# Patient Record
Sex: FEMALE | Race: ASIAN | NOT HISPANIC OR LATINO | Employment: OTHER | URBAN - METROPOLITAN AREA
[De-identification: names, ages, dates, MRNs, and addresses within clinical notes are randomized per-mention and may not be internally consistent; named-entity substitution may affect disease eponyms.]

---
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
## 2019-09-23 ENCOUNTER — RECORDS - HEALTHEAST (OUTPATIENT)
Dept: LAB | Facility: CLINIC | Age: 19
End: 2019-09-23

## 2019-09-23 LAB — HIV 1+2 AB+HIV1 P24 AG SERPL QL IA: NEGATIVE

## 2019-09-24 LAB
C TRACH DNA SPEC QL PROBE+SIG AMP: NEGATIVE
N GONORRHOEA DNA SPEC QL NAA+PROBE: NEGATIVE
T PALLIDUM AB SER QL: NEGATIVE

## 2022-10-10 ENCOUNTER — LAB REQUISITION (OUTPATIENT)
Dept: LAB | Facility: CLINIC | Age: 22
End: 2022-10-10

## 2022-10-10 DIAGNOSIS — Z00.00 ENCOUNTER FOR GENERAL ADULT MEDICAL EXAMINATION WITHOUT ABNORMAL FINDINGS: ICD-10-CM

## 2022-10-10 DIAGNOSIS — Z01.419 ENCOUNTER FOR GYNECOLOGICAL EXAMINATION (GENERAL) (ROUTINE) WITHOUT ABNORMAL FINDINGS: ICD-10-CM

## 2022-10-10 PROCEDURE — G0145 SCR C/V CYTO,THINLAYER,RESCR: HCPCS | Performed by: PHYSICIAN ASSISTANT

## 2022-10-10 PROCEDURE — 87491 CHLMYD TRACH DNA AMP PROBE: CPT | Performed by: PHYSICIAN ASSISTANT

## 2022-10-11 LAB
C TRACH DNA SPEC QL PROBE+SIG AMP: NEGATIVE
N GONORRHOEA DNA SPEC QL NAA+PROBE: NEGATIVE

## 2022-10-13 LAB
BKR LAB AP GYN ADEQUACY: NORMAL
BKR LAB AP GYN INTERPRETATION: NORMAL
BKR LAB AP HPV REFLEX: NORMAL
BKR LAB AP LMP: NORMAL
BKR LAB AP PREVIOUS ABNORMAL: NORMAL
PATH REPORT.COMMENTS IMP SPEC: NORMAL
PATH REPORT.COMMENTS IMP SPEC: NORMAL
PATH REPORT.RELEVANT HX SPEC: NORMAL

## 2023-02-01 ENCOUNTER — LAB REQUISITION (OUTPATIENT)
Dept: LAB | Facility: CLINIC | Age: 23
End: 2023-02-01

## 2023-02-01 DIAGNOSIS — O20.9 HEMORRHAGE IN EARLY PREGNANCY, UNSPECIFIED: ICD-10-CM

## 2023-02-01 LAB — HCG INTACT+B SERPL-ACNC: ABNORMAL MIU/ML

## 2023-02-01 PROCEDURE — 84702 CHORIONIC GONADOTROPIN TEST: CPT | Performed by: PHYSICIAN ASSISTANT

## 2023-03-24 ENCOUNTER — LAB REQUISITION (OUTPATIENT)
Dept: LAB | Facility: CLINIC | Age: 23
End: 2023-03-24

## 2023-03-24 DIAGNOSIS — Z36.9 ENCOUNTER FOR ANTENATAL SCREENING, UNSPECIFIED: ICD-10-CM

## 2023-03-24 LAB
BASOPHILS # BLD AUTO: 0 10E3/UL (ref 0–0.2)
BASOPHILS NFR BLD AUTO: 0 %
EOSINOPHIL # BLD AUTO: 0 10E3/UL (ref 0–0.7)
EOSINOPHIL NFR BLD AUTO: 0 %
ERYTHROCYTE [DISTWIDTH] IN BLOOD BY AUTOMATED COUNT: 13.4 % (ref 10–15)
HCT VFR BLD AUTO: 36.9 % (ref 35–47)
HGB BLD-MCNC: 12 G/DL (ref 11.7–15.7)
IMM GRANULOCYTES # BLD: 0.1 10E3/UL
IMM GRANULOCYTES NFR BLD: 1 %
LYMPHOCYTES # BLD AUTO: 1.2 10E3/UL (ref 0.8–5.3)
LYMPHOCYTES NFR BLD AUTO: 17 %
MCH RBC QN AUTO: 29.6 PG (ref 26.5–33)
MCHC RBC AUTO-ENTMCNC: 32.5 G/DL (ref 31.5–36.5)
MCV RBC AUTO: 91 FL (ref 78–100)
MONOCYTES # BLD AUTO: 0.4 10E3/UL (ref 0–1.3)
MONOCYTES NFR BLD AUTO: 5 %
NEUTROPHILS # BLD AUTO: 5.6 10E3/UL (ref 1.6–8.3)
NEUTROPHILS NFR BLD AUTO: 77 %
NRBC # BLD AUTO: 0 10E3/UL
NRBC BLD AUTO-RTO: 0 /100
PLATELET # BLD AUTO: 235 10E3/UL (ref 150–450)
RBC # BLD AUTO: 4.06 10E6/UL (ref 3.8–5.2)
WBC # BLD AUTO: 7.3 10E3/UL (ref 4–11)

## 2023-03-24 PROCEDURE — 86901 BLOOD TYPING SEROLOGIC RH(D): CPT | Performed by: NURSE PRACTITIONER

## 2023-03-24 PROCEDURE — 87340 HEPATITIS B SURFACE AG IA: CPT | Performed by: NURSE PRACTITIONER

## 2023-03-24 PROCEDURE — 86803 HEPATITIS C AB TEST: CPT | Performed by: NURSE PRACTITIONER

## 2023-03-24 PROCEDURE — 87086 URINE CULTURE/COLONY COUNT: CPT | Performed by: NURSE PRACTITIONER

## 2023-03-24 PROCEDURE — 87491 CHLMYD TRACH DNA AMP PROBE: CPT | Performed by: NURSE PRACTITIONER

## 2023-03-24 PROCEDURE — 87389 HIV-1 AG W/HIV-1&-2 AB AG IA: CPT | Performed by: NURSE PRACTITIONER

## 2023-03-24 PROCEDURE — 80081 OBSTETRIC PANEL INC HIV TSTG: CPT | Performed by: NURSE PRACTITIONER

## 2023-03-24 PROCEDURE — 86850 RBC ANTIBODY SCREEN: CPT | Performed by: NURSE PRACTITIONER

## 2023-03-24 PROCEDURE — 86762 RUBELLA ANTIBODY: CPT | Performed by: NURSE PRACTITIONER

## 2023-03-25 LAB
ABO/RH(D): NORMAL
ANTIBODY SCREEN: NEGATIVE
C TRACH DNA SPEC QL PROBE+SIG AMP: NEGATIVE
HBV SURFACE AG SERPL QL IA: NONREACTIVE
HCV AB SERPL QL IA: NONREACTIVE
HIV 1+2 AB+HIV1 P24 AG SERPL QL IA: NONREACTIVE
N GONORRHOEA DNA SPEC QL NAA+PROBE: NEGATIVE
SPECIMEN EXPIRATION DATE: NORMAL

## 2023-03-26 LAB — BACTERIA UR CULT: NO GROWTH

## 2023-03-27 LAB
RPR SER QL: NONREACTIVE
RUBV IGG SERPL QL IA: 2.05 INDEX
RUBV IGG SERPL QL IA: POSITIVE

## 2023-10-24 ENCOUNTER — LAB REQUISITION (OUTPATIENT)
Dept: LAB | Facility: CLINIC | Age: 23
End: 2023-10-24

## 2023-10-24 PROCEDURE — G0145 SCR C/V CYTO,THINLAYER,RESCR: HCPCS | Performed by: NURSE PRACTITIONER

## 2023-10-27 LAB
BKR LAB AP GYN ADEQUACY: NORMAL
BKR LAB AP GYN INTERPRETATION: NORMAL
BKR LAB AP HPV REFLEX: NORMAL
BKR LAB AP LMP: NORMAL
BKR LAB AP PREVIOUS ABNL DX: NORMAL
BKR LAB AP PREVIOUS ABNORMAL: NORMAL
PATH REPORT.COMMENTS IMP SPEC: NORMAL
PATH REPORT.COMMENTS IMP SPEC: NORMAL
PATH REPORT.RELEVANT HX SPEC: NORMAL

## 2023-12-07 ENCOUNTER — LAB REQUISITION (OUTPATIENT)
Dept: LAB | Facility: CLINIC | Age: 23
End: 2023-12-07

## 2023-12-07 DIAGNOSIS — Z13.1 ENCOUNTER FOR SCREENING FOR DIABETES MELLITUS: ICD-10-CM

## 2023-12-07 DIAGNOSIS — Z13.220 ENCOUNTER FOR SCREENING FOR LIPOID DISORDERS: ICD-10-CM

## 2023-12-07 PROCEDURE — 80048 BASIC METABOLIC PNL TOTAL CA: CPT | Performed by: PHYSICIAN ASSISTANT

## 2023-12-07 PROCEDURE — 80061 LIPID PANEL: CPT | Performed by: PHYSICIAN ASSISTANT

## 2023-12-08 LAB
ANION GAP SERPL CALCULATED.3IONS-SCNC: 11 MMOL/L (ref 7–15)
BUN SERPL-MCNC: 10.1 MG/DL (ref 6–20)
CALCIUM SERPL-MCNC: 9.1 MG/DL (ref 8.6–10)
CHLORIDE SERPL-SCNC: 104 MMOL/L (ref 98–107)
CHOLEST SERPL-MCNC: 190 MG/DL
CREAT SERPL-MCNC: 0.59 MG/DL (ref 0.51–0.95)
DEPRECATED HCO3 PLAS-SCNC: 25 MMOL/L (ref 22–29)
EGFRCR SERPLBLD CKD-EPI 2021: >90 ML/MIN/1.73M2
FASTING STATUS PATIENT QL REPORTED: ABNORMAL
GLUCOSE SERPL-MCNC: 96 MG/DL (ref 70–99)
HDLC SERPL-MCNC: 52 MG/DL
LDLC SERPL CALC-MCNC: 114 MG/DL
NONHDLC SERPL-MCNC: 138 MG/DL
POTASSIUM SERPL-SCNC: 3.9 MMOL/L (ref 3.4–5.3)
SODIUM SERPL-SCNC: 140 MMOL/L (ref 135–145)
TRIGL SERPL-MCNC: 121 MG/DL

## 2024-03-05 ENCOUNTER — LAB REQUISITION (OUTPATIENT)
Dept: LAB | Facility: CLINIC | Age: 24
End: 2024-03-05

## 2024-03-05 DIAGNOSIS — B35.1 TINEA UNGUIUM: ICD-10-CM

## 2024-03-05 PROCEDURE — 80076 HEPATIC FUNCTION PANEL: CPT | Performed by: PHYSICIAN ASSISTANT

## 2024-03-06 LAB
ALBUMIN SERPL BCG-MCNC: 4.3 G/DL (ref 3.5–5.2)
ALP SERPL-CCNC: 74 U/L (ref 40–150)
ALT SERPL W P-5'-P-CCNC: 38 U/L (ref 0–50)
AST SERPL W P-5'-P-CCNC: 27 U/L (ref 0–45)
BILIRUB DIRECT SERPL-MCNC: <0.2 MG/DL (ref 0–0.3)
BILIRUB SERPL-MCNC: 0.2 MG/DL
PROT SERPL-MCNC: 7.9 G/DL (ref 6.4–8.3)

## 2024-06-05 ENCOUNTER — LAB REQUISITION (OUTPATIENT)
Dept: LAB | Facility: CLINIC | Age: 24
End: 2024-06-05

## 2024-06-05 DIAGNOSIS — O20.9 HEMORRHAGE IN EARLY PREGNANCY, UNSPECIFIED: ICD-10-CM

## 2024-06-05 PROCEDURE — 84702 CHORIONIC GONADOTROPIN TEST: CPT | Performed by: FAMILY MEDICINE

## 2024-06-06 LAB — HCG INTACT+B SERPL-ACNC: ABNORMAL MIU/ML

## 2024-08-14 ENCOUNTER — LAB REQUISITION (OUTPATIENT)
Dept: LAB | Facility: CLINIC | Age: 24
End: 2024-08-14

## 2024-08-14 DIAGNOSIS — Z36.9 ENCOUNTER FOR ANTENATAL SCREENING, UNSPECIFIED: ICD-10-CM

## 2024-08-14 LAB
ABO/RH(D): NORMAL
ANTIBODY SCREEN: NEGATIVE
BASOPHILS # BLD AUTO: 0 10E3/UL (ref 0–0.2)
BASOPHILS NFR BLD AUTO: 0 %
EOSINOPHIL # BLD AUTO: 0.1 10E3/UL (ref 0–0.7)
EOSINOPHIL NFR BLD AUTO: 1 %
ERYTHROCYTE [DISTWIDTH] IN BLOOD BY AUTOMATED COUNT: 14 % (ref 10–15)
HBV SURFACE AG SERPL QL IA: NONREACTIVE
HCT VFR BLD AUTO: 36.6 % (ref 35–47)
HCV AB SERPL QL IA: NONREACTIVE
HGB BLD-MCNC: 12 G/DL (ref 11.7–15.7)
HIV 1+2 AB+HIV1 P24 AG SERPL QL IA: NONREACTIVE
HOLD SPECIMEN: NORMAL
IMM GRANULOCYTES # BLD: 0.1 10E3/UL
IMM GRANULOCYTES NFR BLD: 1 %
LYMPHOCYTES # BLD AUTO: 1.4 10E3/UL (ref 0.8–5.3)
LYMPHOCYTES NFR BLD AUTO: 20 %
MCH RBC QN AUTO: 28.4 PG (ref 26.5–33)
MCHC RBC AUTO-ENTMCNC: 32.8 G/DL (ref 31.5–36.5)
MCV RBC AUTO: 87 FL (ref 78–100)
MONOCYTES # BLD AUTO: 0.3 10E3/UL (ref 0–1.3)
MONOCYTES NFR BLD AUTO: 4 %
NEUTROPHILS # BLD AUTO: 5.3 10E3/UL (ref 1.6–8.3)
NEUTROPHILS NFR BLD AUTO: 74 %
NRBC # BLD AUTO: 0 10E3/UL
NRBC BLD AUTO-RTO: 0 /100
PLATELET # BLD AUTO: 253 10E3/UL (ref 150–450)
RBC # BLD AUTO: 4.22 10E6/UL (ref 3.8–5.2)
SPECIMEN EXPIRATION DATE: NORMAL
WBC # BLD AUTO: 7.1 10E3/UL (ref 4–11)

## 2024-08-14 PROCEDURE — 86900 BLOOD TYPING SEROLOGIC ABO: CPT

## 2024-08-14 PROCEDURE — 85025 COMPLETE CBC W/AUTO DIFF WBC: CPT

## 2024-08-14 PROCEDURE — 87389 HIV-1 AG W/HIV-1&-2 AB AG IA: CPT

## 2024-08-14 PROCEDURE — 86762 RUBELLA ANTIBODY: CPT

## 2024-08-14 PROCEDURE — 87340 HEPATITIS B SURFACE AG IA: CPT

## 2024-08-14 PROCEDURE — 86803 HEPATITIS C AB TEST: CPT

## 2024-08-14 PROCEDURE — 87086 URINE CULTURE/COLONY COUNT: CPT

## 2024-08-15 LAB
RPR SER QL: NONREACTIVE
RUBV IGG SERPL QL IA: 2.2 INDEX
RUBV IGG SERPL QL IA: POSITIVE

## 2024-08-16 LAB — BACTERIA UR CULT: NORMAL

## 2024-09-06 ENCOUNTER — TRANSCRIBE ORDERS (OUTPATIENT)
Dept: MATERNAL FETAL MEDICINE | Facility: CLINIC | Age: 24
End: 2024-09-06
Payer: COMMERCIAL

## 2024-09-06 ENCOUNTER — TRANSFERRED RECORDS (OUTPATIENT)
Dept: HEALTH INFORMATION MANAGEMENT | Facility: CLINIC | Age: 24
End: 2024-09-06
Payer: COMMERCIAL

## 2024-09-06 ENCOUNTER — MEDICAL CORRESPONDENCE (OUTPATIENT)
Dept: HEALTH INFORMATION MANAGEMENT | Facility: CLINIC | Age: 24
End: 2024-09-06
Payer: COMMERCIAL

## 2024-09-06 DIAGNOSIS — O26.90 PREGNANCY RELATED CONDITION, ANTEPARTUM: Primary | ICD-10-CM

## 2024-09-09 ENCOUNTER — PRE VISIT (OUTPATIENT)
Dept: MATERNAL FETAL MEDICINE | Facility: CLINIC | Age: 24
End: 2024-09-09
Payer: COMMERCIAL

## 2024-09-09 ENCOUNTER — TRANSFERRED RECORDS (OUTPATIENT)
Dept: HEALTH INFORMATION MANAGEMENT | Facility: CLINIC | Age: 24
End: 2024-09-09
Payer: COMMERCIAL

## 2024-09-10 ENCOUNTER — OFFICE VISIT (OUTPATIENT)
Dept: MATERNAL FETAL MEDICINE | Facility: CLINIC | Age: 24
End: 2024-09-10
Attending: OBSTETRICS & GYNECOLOGY
Payer: COMMERCIAL

## 2024-09-10 ENCOUNTER — HOSPITAL ENCOUNTER (OUTPATIENT)
Dept: ULTRASOUND IMAGING | Facility: CLINIC | Age: 24
Discharge: HOME | End: 2024-09-10
Attending: OBSTETRICS & GYNECOLOGY
Payer: COMMERCIAL

## 2024-09-10 DIAGNOSIS — O26.90 PREGNANCY RELATED CONDITION, ANTEPARTUM: ICD-10-CM

## 2024-09-10 DIAGNOSIS — O36.5920 POOR CLINICAL FETAL GROWTH IN SECOND TRIMESTER, SINGLE OR UNSPECIFIED FETUS: Primary | ICD-10-CM

## 2024-09-10 DIAGNOSIS — O28.3 ABNORMAL PRENATAL ULTRASOUND: Primary | ICD-10-CM

## 2024-09-10 PROCEDURE — 76811 OB US DETAILED SNGL FETUS: CPT

## 2024-09-10 PROCEDURE — 96040 HC GENETIC COUNSELING, EACH 30 MINUTES: CPT

## 2024-09-10 PROCEDURE — 76811 OB US DETAILED SNGL FETUS: CPT | Mod: 26 | Performed by: OBSTETRICS & GYNECOLOGY

## 2024-09-10 PROCEDURE — G0463 HOSPITAL OUTPT CLINIC VISIT: HCPCS | Mod: 25 | Performed by: OBSTETRICS & GYNECOLOGY

## 2024-09-10 PROCEDURE — 99203 OFFICE O/P NEW LOW 30 MIN: CPT | Mod: 25 | Performed by: OBSTETRICS & GYNECOLOGY

## 2024-09-10 NOTE — NURSING NOTE
Patient presents to [facility] for L2 at 21w4d due to FGR on outside US. Denies LOF, vaginal bleeding, cramping/contractions. SBAR given to [facility], see their note in Epic.

## 2024-09-10 NOTE — PROGRESS NOTES
"[facility] Maternal Fetal Medicine Center  Genetic Counseling Consult    Patient:  Sisi Aquino YOB: 2000   Date of Service:  9/10/24   MRN: [id number]    Sisi was seen at the [facility] M[facility]etal [facility] for genetic consultation. The indication for genetic counseling is abnormal fetal ultrasound at outside clinic showing an EFW <2%. The patient was accompanied to this visit by her partner, Sav, and their daughter, Leann.    The session was conducted in English.        IMPRESSION/ PLAN   1. Sisi has not had genetic screening in this pregnancy and declines options discussed today.    2. During today's MF visit, Sisi had a genetic counseling session only. Screening and diagnostic testing was discussed and declined.     3. Sisi had a level II comprehensive anatomy ultrasound today. Please see the ultrasound report for further details.    4. Further recommendation include a follow-up ultrasound with [facility]. The appointment(s) has not been scheduled yet. MFM schedulers will reach out or the patient can call [phone].     PREGNANCY HISTORY   /Parity:     No heavy bleeding, severe cramping, fevers, or exposures of concern were reported at today's visit. Sisi's pregnancy history is significant for:   One term  with her current partner (1 year old daughter, healthy)  Birthweight 5 lbs 3 oz    CURRENT PREGNANCY   Current Age: 24 year old   Age at Delivery: 24 year old  SHAYNE: 2025, by Last Menstrual Period                                   Gestational Age: 21w4d  This pregnancy is a single gestation.   This pregnancy was conceived spontaneously.    MEDICAL HISTORY   Sisi s reported medical history is not expected to impact pregnancy management or risks to fetal development.       FAMILY HISTORY   A three-generation family history was obtained today and is scanned under the \"Media\" tab in Epic. The family history was reported " by Sisi and Sav.    The following significant findings were reported today:   Sisi had one sister who passed away shortly after birth. She was born with an intestinal malformation.  Structural differences at birth (also called birth defects) occur in about 3-5% of all pregnancies. Often times isolated birth defects occur randomly in an individual. If there is no other family history of similar structural differences, the recurrence risk is likely low. However, sometimes structural differences may be a feature of an underlying genetic condition. Without performing genetic testing on the affected relative this possibility cannot be completely ruled out.   Sav has one sister who has Down syndrome.  Over 95% of cases of Down syndrome result from trisomy 21 caused by nondisjunction.  Rarely, Down syndrome occurs due to a translocation involving chromosome 21, which, if carried by other family members, puts them at increased risk to have a baby with Down syndrome. This is likely a sporadic case in Sav's sister, however a familial translocation cannot be excluded without further details of the affected individual.      Otherwise, the reported family history is unremarkable for multiple miscarriages, stillbirths, birth defects, intellectual disabilities, autism spectrum disorder, developmental delays, cancer diagnosed under 50, known genetic conditions, and consanguinity.       RISK ASSESSMENT FOR INHERITED CONDITIONS AND CARRIER SCREENING OPTIONS   Carrier screening was not discussed in depth today due to focus on other topics and patient's desire to base her screening decisions on ultrasound results. If the patient is interested in further discussing the option of carrier screening, MFM would be available to assist in coordination if desired. Information below for reference.    Expanded carrier screening is available to screen for autosomal recessive conditions and X-linked conditions in a large list of genes.  Carrier screening does not test the pregnancy but gives a risk assessment for the pregnancy and future pregnancies to have the condition. Expanded carrier screening is designed to identify carrier status for conditions that are primarily childhood or adolescent onset. Expanded carrier screening does not evaluate for adult-onset conditions such as hereditary cancer syndromes, dementia/Alzheimer's disease, or cardiovascular disease risk factors. Additionally, expanded carrier screening is not comprehensive for all known genetic diseases or inherited conditions. Carrier screening does not test for all genetic and health conditions or risk factors.     Autosomal recessive conditions happen when a mutation has been inherited from the egg and sperm and include conditions like cystic fibrosis, thalassemia, hearing loss, spinal muscular atrophy, and more. We reviewed that when both biological parents carry a harmful genetic change in a gene associated with autosomal recessive inheritance, each of their pregnancies has a 1 in 4 (25%) chance to be affected by that condition. X-linked conditions happen when a mutation has been inherited from the egg and include conditions like fragile X syndrome.With X-linked conditions, the specific risk generally depends on the chromosomal sex of the fetus, with XY individuals (generally male) being most severely affected.      screening  is another avenue through which a subset of these conditions may be diagnosed. About [place]  Screening    The patient does NOT have a family history of known inherited conditions. This does NOT mean the patient and/or their partner is not a carrier of a condition. Approximately 90% of couples at an increased reproductive risk for an inherited condition have no family history of that condition. The patient has not had carrier screening previously.     RISK ASSESSMENT FOR CHROMOSOME CONDITIONS   The risk for fetal chromosome abnormalities increases  with maternal age.   At age 24 at midtrimester, the risk to have a baby with Down syndrome is 1 in 1040.  At age 24 at midtrimester, the risk to have a baby with any chromosome abnormality is 1 in 520.     EFW <2%    We reviewed that Sisi was referred to [facility] due to the EFW measuring less than the second percentile with her OB. We reviewed that this finding could be confirmed today, we could find additional concerns on ultrasound today such as intrauterine fetal growth restriction (IUGR), or ultrasound could be normal today. IUGR is commonly defined as an estimated fetal weight below the tenth percentile for any gestational age.     We discussed that there can be many explanations for a small EFW including constitutionally small size, maternal factors (such as low pre-pregnancy weight, exposures like infections, and conditions such as autoimmune conditions), placental factors (such as placental insufficiency, confined placental mosaicism, and a single umbilical artery), or fetal factors. 5-20% of cases of IUGR are due to a fetal genetic disorder such as aneuploidy. 90% of IUGR cases are non-syndromic.     Additional ultrasounds and monitoring throughout the pregnancy are common when IUGR is detected. Commonly growth ultrasounds are performed every few weeks as well as studies of the blood flow through the umbilical artery. Genetic amniocentesis is recommended to consider. Typically a chromosome microarray analysis is ordered to look for common genetic causes like aneuploidy and microdeletion syndromes. If other ultrasound findings or family history suggest the likelihood of other genetic conditions then a panel or single gene test may be recommended. Infection studies, such as CMV, is commonly ordered on the amniotic fluid as well. Depending on the gestational age, maternal serology may also be collected to aid in the interpretation of the amniotic fluid infection results. Non-invasive methods are also available  and would likely include cell-free DNA aneuploidy screening for common trisomies and maternal serology studies.     Sisi has not had genetic screening in this pregnancy and declines options discussed today. She shared that if additional concerns are identified on ultrasound today she would consider genetic screening options.     GENETIC TESTING OPTIONS FOR CHROMOSOMAL CONDITIONS   Genetic testing during a pregnancy includes screening and diagnostic procedures.      Screening tests are non-invasive which means no risk to the pregnancy and includes ultrasounds and blood work. The benefits and limitations of screening were reviewed. Screening tests provide a risk assessment (chance) specific to the pregnancy for certain fetal chromosome abnormalities but cannot definitively diagnose or exclude a fetal chromosome abnormality. Follow-up genetic counseling and consideration of diagnostic testing is recommended with any abnormal screening result. Diagnostic testing during a pregnancy is more certain and can test for more conditions. However, the tests do have a risk of miscarriage that requires careful consideration. These tests can detect fetal chromosome abnormalities with greater than 99% certainty. Results can be compromised by maternal cell contamination or mosaicism and are limited by the resolution of current genetic testing technology.     There is no screening or diagnostic test that detects all forms of birth defects or intellectual disability.     We did not discuss screening options in detail, information below for reference:   Non-invasive prenatal testing (NIPT)  Also called cell-free DNA screening because it detects chromosomes from the placenta in the pregnant person's blood  Can be done any time after 10 weeks gestation  Screens for trisomy 21, trisomy 18, trisomy 13, and sex chromosome aneuploidies  Cannot screen for open neural tube defects, maternal serum AFP after 15 weeks is recommended    We  discussed the following ultrasound options:  Comprehensive level II ultrasound (Fetal Anatomy Ultrasound)  Ultrasound done between 18-20 weeks gestation  Screens for major birth defects and markers for aneuploidy (like trisomy 21 and trisomy 18)  Includes looking at the fetus/baby's growth, heart, organs (stomach, kidneys), placenta, and amniotic fluid    We did not discuss diagnostic options in detail, information below for reference:   Amniocentesis  Invasive diagnostic procedure done after 15 weeks gestation  The procedure collects a small sample of amniotic fluid for the purpose of chromosomal testing and/or other genetic testing  Diagnostic result; more than 99% sensitivity for fetal chromosome abnormalities  Testing for AFP in the amniotic fluid can test for open neural tube defects           It was a pleasure to be involved with [facility]. Face-to-face time of the meeting was  20  minutes.    Maye Loera MS, [facility]  Maternal Fetal Medicine  Office: [phone]  [facility]: [phone]   Fax: [phone]  [facility]

## 2024-09-10 NOTE — PROGRESS NOTES
The patient was seen for an ultrasound in the Maternal-Fetal Medicine Center at the [facility] today.  For a detailed report of the ultrasound examination, please see the ultrasound report which can be found under the imaging tab.    If you have questions regarding today's evaluation or if we can be of further service, please contact the Maternal-Fetal Medicine Center.    Dara Celestin MD  , OB/GYN  Maternal-Fetal Medicine  [phone] (Pager)

## 2024-10-01 ENCOUNTER — HOSPITAL ENCOUNTER (OUTPATIENT)
Dept: ULTRASOUND IMAGING | Facility: CLINIC | Age: 24
Discharge: HOME | End: 2024-10-01
Attending: OBSTETRICS & GYNECOLOGY
Payer: COMMERCIAL

## 2024-10-01 ENCOUNTER — OFFICE VISIT (OUTPATIENT)
Dept: MATERNAL FETAL MEDICINE | Facility: CLINIC | Age: 24
End: 2024-10-01
Attending: OBSTETRICS & GYNECOLOGY
Payer: COMMERCIAL

## 2024-10-01 DIAGNOSIS — O36.5920 POOR CLINICAL FETAL GROWTH IN SECOND TRIMESTER, SINGLE OR UNSPECIFIED FETUS: ICD-10-CM

## 2024-10-01 DIAGNOSIS — O36.5920 POOR CLINICAL FETAL GROWTH IN SECOND TRIMESTER, SINGLE OR UNSPECIFIED FETUS: Primary | ICD-10-CM

## 2024-10-01 PROCEDURE — 76816 OB US FOLLOW-UP PER FETUS: CPT | Mod: 26 | Performed by: OBSTETRICS & GYNECOLOGY

## 2024-10-01 PROCEDURE — 76816 OB US FOLLOW-UP PER FETUS: CPT

## 2024-10-01 NOTE — PROGRESS NOTES
Please see full imaging report from [employer] program under imaging tab.    Casandra Steele MD  Maternal Fetal Medicine

## 2024-10-01 NOTE — NURSING NOTE
Patient presents to [facility] for RL2 at 24w4d due to EFW 12%. Positive fetal movement. Denies LOF, vaginal bleeding or cramping/contractions. SBAR given to [facility], see their note in Epic.

## 2024-10-08 ENCOUNTER — TRANSFERRED RECORDS (OUTPATIENT)
Dept: HEALTH INFORMATION MANAGEMENT | Facility: CLINIC | Age: 24
End: 2024-10-08
Payer: COMMERCIAL

## 2024-10-22 ENCOUNTER — HOSPITAL ENCOUNTER (OUTPATIENT)
Dept: ULTRASOUND IMAGING | Facility: CLINIC | Age: 24
Discharge: HOME | End: 2024-10-22
Attending: STUDENT IN AN ORGANIZED HEALTH CARE EDUCATION/TRAINING PROGRAM
Payer: COMMERCIAL

## 2024-10-22 ENCOUNTER — OFFICE VISIT (OUTPATIENT)
Dept: MATERNAL FETAL MEDICINE | Facility: CLINIC | Age: 24
End: 2024-10-22
Attending: STUDENT IN AN ORGANIZED HEALTH CARE EDUCATION/TRAINING PROGRAM
Payer: COMMERCIAL

## 2024-10-22 VITALS — SYSTOLIC BLOOD PRESSURE: 124 MMHG | DIASTOLIC BLOOD PRESSURE: 77 MMHG | HEART RATE: 86 BPM

## 2024-10-22 DIAGNOSIS — O36.5920 POOR CLINICAL FETAL GROWTH IN SECOND TRIMESTER, SINGLE OR UNSPECIFIED FETUS: ICD-10-CM

## 2024-10-22 DIAGNOSIS — O36.5990 FETAL GROWTH RESTRICTION ANTEPARTUM: Primary | ICD-10-CM

## 2024-10-22 PROCEDURE — 76820 UMBILICAL ARTERY ECHO: CPT | Mod: 26 | Performed by: STUDENT IN AN ORGANIZED HEALTH CARE EDUCATION/TRAINING PROGRAM

## 2024-10-22 PROCEDURE — 59025 FETAL NON-STRESS TEST: CPT | Mod: 26 | Performed by: STUDENT IN AN ORGANIZED HEALTH CARE EDUCATION/TRAINING PROGRAM

## 2024-10-22 PROCEDURE — 76820 UMBILICAL ARTERY ECHO: CPT

## 2024-10-22 PROCEDURE — 59025 FETAL NON-STRESS TEST: CPT

## 2024-10-22 PROCEDURE — 99213 OFFICE O/P EST LOW 20 MIN: CPT | Mod: 25 | Performed by: STUDENT IN AN ORGANIZED HEALTH CARE EDUCATION/TRAINING PROGRAM

## 2024-10-22 PROCEDURE — 76816 OB US FOLLOW-UP PER FETUS: CPT | Mod: 26 | Performed by: STUDENT IN AN ORGANIZED HEALTH CARE EDUCATION/TRAINING PROGRAM

## 2024-10-22 NOTE — NURSING NOTE
Patient reports positive fetal movement, no pain, no contractions, leaking of fluid, or bleeding.   SBAR given to [facility], see their note in Epic.    NST Performed due to FGR.  Dr. ANUPAMA Perry reviewed efm tracing. See NST/BPP Doc Flowsheet tab.

## 2024-10-22 NOTE — PROGRESS NOTES
"Please see \"Imaging\" tab under \"Chart Review\" for details of today's visit.    Kajal Perry    "

## 2024-10-25 ENCOUNTER — LAB REQUISITION (OUTPATIENT)
Dept: LAB | Facility: CLINIC | Age: 24
End: 2024-10-25

## 2024-10-25 DIAGNOSIS — Z36.9 ENCOUNTER FOR ANTENATAL SCREENING, UNSPECIFIED: ICD-10-CM

## 2024-10-25 PROCEDURE — 86592 SYPHILIS TEST NON-TREP QUAL: CPT | Performed by: ADVANCED PRACTICE MIDWIFE

## 2024-10-28 LAB — RPR SER QL: NONREACTIVE

## 2024-10-29 ENCOUNTER — OFFICE VISIT (OUTPATIENT)
Dept: MATERNAL FETAL MEDICINE | Facility: CLINIC | Age: 24
End: 2024-10-29
Attending: STUDENT IN AN ORGANIZED HEALTH CARE EDUCATION/TRAINING PROGRAM
Payer: COMMERCIAL

## 2024-10-29 ENCOUNTER — HOSPITAL ENCOUNTER (OUTPATIENT)
Dept: ULTRASOUND IMAGING | Facility: CLINIC | Age: 24
Discharge: HOME | End: 2024-10-29
Attending: STUDENT IN AN ORGANIZED HEALTH CARE EDUCATION/TRAINING PROGRAM
Payer: COMMERCIAL

## 2024-10-29 VITALS — DIASTOLIC BLOOD PRESSURE: 57 MMHG | SYSTOLIC BLOOD PRESSURE: 119 MMHG | RESPIRATION RATE: 16 BRPM | HEART RATE: 87 BPM

## 2024-10-29 DIAGNOSIS — O36.5990 FETAL GROWTH RESTRICTION ANTEPARTUM: Primary | ICD-10-CM

## 2024-10-29 DIAGNOSIS — O36.5990 FETAL GROWTH RESTRICTION ANTEPARTUM: ICD-10-CM

## 2024-10-29 PROCEDURE — 59025 FETAL NON-STRESS TEST: CPT | Mod: 26 | Performed by: OBSTETRICS & GYNECOLOGY

## 2024-10-29 PROCEDURE — 76820 UMBILICAL ARTERY ECHO: CPT

## 2024-10-29 PROCEDURE — 76820 UMBILICAL ARTERY ECHO: CPT | Mod: 26 | Performed by: OBSTETRICS & GYNECOLOGY

## 2024-10-29 PROCEDURE — 59025 FETAL NON-STRESS TEST: CPT

## 2024-10-29 PROCEDURE — 76815 OB US LIMITED FETUS(S): CPT | Mod: 26 | Performed by: OBSTETRICS & GYNECOLOGY

## 2024-10-29 NOTE — PROGRESS NOTES
The patient was seen for an ultrasound in the Maternal-Fetal Medicine Center at the [facility] today.  For a detailed report of the ultrasound examination, please see the ultrasound report which can be found under the imaging tab.    If you have questions regarding today's evaluation or if we can be of further service, please contact the Maternal-Fetal Medicine Center.    Dara Celestin MD  , OB/GYN  Maternal-Fetal Medicine  [phone] (Pager)

## 2024-10-29 NOTE — NURSING NOTE
Patient presents to [facility] for Limited/UAR at 28w4d due to fetal growth restriction. Positive fetal movement. Denies LOF, vaginal bleeding or cramping/contractions. SBAR given to [facility] MD, see their note in Epic. NST performed. Dr. Celestin reviewed efm tracing. See NST/B[facility] Doc Flowsheet tab. Continue weekly surveillance with MFM.

## 2024-11-05 ENCOUNTER — HOSPITAL ENCOUNTER (OUTPATIENT)
Dept: ULTRASOUND IMAGING | Facility: CLINIC | Age: 24
Discharge: HOME | End: 2024-11-05
Attending: STUDENT IN AN ORGANIZED HEALTH CARE EDUCATION/TRAINING PROGRAM
Payer: COMMERCIAL

## 2024-11-05 ENCOUNTER — OFFICE VISIT (OUTPATIENT)
Dept: MATERNAL FETAL MEDICINE | Facility: CLINIC | Age: 24
End: 2024-11-05
Attending: STUDENT IN AN ORGANIZED HEALTH CARE EDUCATION/TRAINING PROGRAM
Payer: COMMERCIAL

## 2024-11-05 DIAGNOSIS — O36.5990 FETAL GROWTH RESTRICTION ANTEPARTUM: ICD-10-CM

## 2024-11-05 PROCEDURE — 59025 FETAL NON-STRESS TEST: CPT

## 2024-11-05 PROCEDURE — 76815 OB US LIMITED FETUS(S): CPT | Mod: 26 | Performed by: STUDENT IN AN ORGANIZED HEALTH CARE EDUCATION/TRAINING PROGRAM

## 2024-11-05 PROCEDURE — 76820 UMBILICAL ARTERY ECHO: CPT

## 2024-11-05 PROCEDURE — 59025 FETAL NON-STRESS TEST: CPT | Mod: 26 | Performed by: STUDENT IN AN ORGANIZED HEALTH CARE EDUCATION/TRAINING PROGRAM

## 2024-11-05 PROCEDURE — 76820 UMBILICAL ARTERY ECHO: CPT | Mod: 26 | Performed by: STUDENT IN AN ORGANIZED HEALTH CARE EDUCATION/TRAINING PROGRAM

## 2024-11-05 NOTE — NURSING NOTE
Patient presents to [facility] for Limited/UAR at 29w4d due to fetal growth restriction. Positive fetal movement. Denies LOF, vaginal bleeding or cramping/contractions. SBAR given to MFM MD, see their note in Epic. NST performed. Dr. ANUPAMA Perry reviewed efm tracing. See NST/BPP Doc Flowsheet tab. Continue weekly surveillance with MFM.

## 2024-11-19 ENCOUNTER — HOSPITAL ENCOUNTER (OUTPATIENT)
Dept: ULTRASOUND IMAGING | Facility: CLINIC | Age: 24
Discharge: HOME | End: 2024-11-19
Attending: OBSTETRICS & GYNECOLOGY
Payer: COMMERCIAL

## 2024-11-19 ENCOUNTER — OFFICE VISIT (OUTPATIENT)
Dept: MATERNAL FETAL MEDICINE | Facility: CLINIC | Age: 24
End: 2024-11-19
Attending: OBSTETRICS & GYNECOLOGY
Payer: COMMERCIAL

## 2024-11-19 VITALS — RESPIRATION RATE: 16 BRPM | HEART RATE: 76 BPM | DIASTOLIC BLOOD PRESSURE: 56 MMHG | SYSTOLIC BLOOD PRESSURE: 115 MMHG

## 2024-11-19 DIAGNOSIS — O36.5990 FETAL GROWTH RESTRICTION ANTEPARTUM: ICD-10-CM

## 2024-11-19 DIAGNOSIS — O36.5990 FETAL GROWTH RESTRICTION ANTEPARTUM: Primary | ICD-10-CM

## 2024-11-19 PROCEDURE — 76820 UMBILICAL ARTERY ECHO: CPT

## 2024-11-19 PROCEDURE — 59025 FETAL NON-STRESS TEST: CPT

## 2024-11-19 PROCEDURE — 76816 OB US FOLLOW-UP PER FETUS: CPT

## 2024-11-19 NOTE — PROGRESS NOTES
The patient was seen for an ultrasound in the Maternal-Fetal Medicine Center at the [facility] today.  For a detailed report of the ultrasound examination, please see the ultrasound report which can be found under the imaging tab.    If you have questions regarding today's evaluation or if we can be of further service, please contact the Maternal-Fetal Medicine Center.    Dara Celestin MD  , OB/GYN  Maternal-Fetal Medicine  [phone] (Pager)

## 2024-11-19 NOTE — NURSING NOTE
Patient presents to [facility] for RL2/UAR at 31w4d due to fetal growth restriction. Positive fetal movement. Denies LOF, vaginal bleeding or cramping/contractions. SBAR given to [facility] MD, see their note in Epic. NST performed. Dr. Celestin reviewed efm tracing. See NST/BPP Doc Flowsheet tab.

## 2024-11-27 ENCOUNTER — TRANSFERRED RECORDS (OUTPATIENT)
Dept: HEALTH INFORMATION MANAGEMENT | Facility: CLINIC | Age: 24
End: 2024-11-27
Payer: COMMERCIAL

## 2024-12-17 ENCOUNTER — HOSPITAL ENCOUNTER (OUTPATIENT)
Dept: ULTRASOUND IMAGING | Facility: CLINIC | Age: 24
Discharge: HOME | End: 2024-12-17
Attending: STUDENT IN AN ORGANIZED HEALTH CARE EDUCATION/TRAINING PROGRAM
Payer: COMMERCIAL

## 2024-12-17 ENCOUNTER — OFFICE VISIT (OUTPATIENT)
Dept: MATERNAL FETAL MEDICINE | Facility: CLINIC | Age: 24
End: 2024-12-17
Attending: STUDENT IN AN ORGANIZED HEALTH CARE EDUCATION/TRAINING PROGRAM
Payer: COMMERCIAL

## 2024-12-17 VITALS — HEART RATE: 80 BPM | SYSTOLIC BLOOD PRESSURE: 120 MMHG | DIASTOLIC BLOOD PRESSURE: 70 MMHG

## 2024-12-17 DIAGNOSIS — O36.5990 FETAL GROWTH RESTRICTION ANTEPARTUM: ICD-10-CM

## 2024-12-17 PROCEDURE — 76816 OB US FOLLOW-UP PER FETUS: CPT | Mod: 26 | Performed by: STUDENT IN AN ORGANIZED HEALTH CARE EDUCATION/TRAINING PROGRAM

## 2024-12-17 PROCEDURE — 59025 FETAL NON-STRESS TEST: CPT

## 2024-12-17 PROCEDURE — 99213 OFFICE O/P EST LOW 20 MIN: CPT | Mod: 25 | Performed by: STUDENT IN AN ORGANIZED HEALTH CARE EDUCATION/TRAINING PROGRAM

## 2024-12-17 PROCEDURE — 59025 FETAL NON-STRESS TEST: CPT | Mod: 26 | Performed by: STUDENT IN AN ORGANIZED HEALTH CARE EDUCATION/TRAINING PROGRAM

## 2024-12-17 PROCEDURE — 76816 OB US FOLLOW-UP PER FETUS: CPT

## 2024-12-17 PROCEDURE — 76820 UMBILICAL ARTERY ECHO: CPT | Mod: 26 | Performed by: STUDENT IN AN ORGANIZED HEALTH CARE EDUCATION/TRAINING PROGRAM

## 2024-12-17 PROCEDURE — 76820 UMBILICAL ARTERY ECHO: CPT

## 2024-12-23 ENCOUNTER — OFFICE VISIT (OUTPATIENT)
Dept: MATERNAL FETAL MEDICINE | Facility: CLINIC | Age: 24
End: 2024-12-23
Attending: STUDENT IN AN ORGANIZED HEALTH CARE EDUCATION/TRAINING PROGRAM
Payer: COMMERCIAL

## 2024-12-23 ENCOUNTER — HOSPITAL ENCOUNTER (OUTPATIENT)
Dept: ULTRASOUND IMAGING | Facility: CLINIC | Age: 24
Discharge: HOME | End: 2024-12-23
Attending: STUDENT IN AN ORGANIZED HEALTH CARE EDUCATION/TRAINING PROGRAM
Payer: COMMERCIAL

## 2024-12-23 VITALS — DIASTOLIC BLOOD PRESSURE: 67 MMHG | HEART RATE: 80 BPM | SYSTOLIC BLOOD PRESSURE: 115 MMHG

## 2024-12-23 DIAGNOSIS — O36.5990 FETAL GROWTH RESTRICTION ANTEPARTUM: Primary | ICD-10-CM

## 2024-12-23 DIAGNOSIS — O36.5990 FETAL GROWTH RESTRICTION ANTEPARTUM: ICD-10-CM

## 2024-12-23 PROCEDURE — 59025 FETAL NON-STRESS TEST: CPT

## 2024-12-23 PROCEDURE — 76820 UMBILICAL ARTERY ECHO: CPT

## 2024-12-23 NOTE — PROGRESS NOTES
The patient was seen for an ultrasound in the Maternal-Fetal Medicine Center today.  For a detailed report of the ultrasound examination, please see the ultrasound report which can be found under the imaging tab.    If you have questions regarding today's evaluation or if we can be of further service, please contact the Maternal-Fetal Medicine Center.    Maria D Calixto MD  , OB/GYN  Maternal-Fetal Medicine

## 2024-12-23 NOTE — NURSING NOTE
Patient reports positive fetal movement, denies pain, denies contractions/pre-term labor, leaking of fluid, or bleeding. Patient denies headache, visual changes, nausea/vomiting, epigastric pain related to preeclampsia. Education provided to patient on Gross/UAR/NST. SBAR given to [facility], see their note in Epic. NST Performed due to FGR.  Dr. Calixto reviewed EFM tracing. See NST/BPP Doc Flowsheet tab.      Bettina Holt RN

## 2025-01-03 ENCOUNTER — LAB REQUISITION (OUTPATIENT)
Dept: LAB | Facility: CLINIC | Age: 25
End: 2025-01-03
Payer: COMMERCIAL

## 2025-01-03 DIAGNOSIS — Z36.85 ENCOUNTER FOR ANTENATAL SCREENING FOR STREPTOCOCCUS B: ICD-10-CM

## 2025-01-03 DIAGNOSIS — Z36.89 ENCOUNTER FOR OTHER SPECIFIED ANTENATAL SCREENING: ICD-10-CM

## 2025-01-03 PROCEDURE — 87653 STREP B DNA AMP PROBE: CPT | Mod: ORL | Performed by: PHYSICIAN ASSISTANT

## 2025-01-04 LAB — GP B STREP DNA SPEC QL NAA+PROBE: NEGATIVE

## 2025-01-07 ENCOUNTER — OFFICE VISIT (OUTPATIENT)
Dept: MATERNAL FETAL MEDICINE | Facility: CLINIC | Age: 25
End: 2025-01-07
Attending: STUDENT IN AN ORGANIZED HEALTH CARE EDUCATION/TRAINING PROGRAM
Payer: COMMERCIAL

## 2025-01-07 ENCOUNTER — HOSPITAL ENCOUNTER (OUTPATIENT)
Dept: ULTRASOUND IMAGING | Facility: CLINIC | Age: 25
Discharge: HOME | End: 2025-01-07
Attending: STUDENT IN AN ORGANIZED HEALTH CARE EDUCATION/TRAINING PROGRAM
Payer: COMMERCIAL

## 2025-01-07 VITALS — SYSTOLIC BLOOD PRESSURE: 132 MMHG | HEART RATE: 100 BPM | DIASTOLIC BLOOD PRESSURE: 64 MMHG

## 2025-01-07 DIAGNOSIS — O36.5990 FETAL GROWTH RESTRICTION ANTEPARTUM: ICD-10-CM

## 2025-01-07 PROCEDURE — 76816 OB US FOLLOW-UP PER FETUS: CPT

## 2025-01-07 PROCEDURE — 59025 FETAL NON-STRESS TEST: CPT

## 2025-01-07 PROCEDURE — 76820 UMBILICAL ARTERY ECHO: CPT

## 2025-01-07 NOTE — PROGRESS NOTES
The patient was seen for an ultrasound in the Maternal-Fetal Medicine Center clinic today.  For a detailed report of the ultrasound examination, please see the ultrasound report which can be found under the imaging tab.    If you have questions regarding today's evaluation or if we can be of further service, please contact the Maternal-Fetal Medicine Center.    Brock Santiago M.D.  Maternal Fetal-Medicine Specialist

## 2025-01-07 NOTE — NURSING NOTE
Sisi Aquino is a  at 38w4d who presents to [facility] for a follow-up growth ultrasound. Pt reports positive fetal movement. Pt denies bldg/lof/change in discharge, contractions, headache, vision changes, chest pain/SOB or edema. SBAR given to Dr. Santiago, see note in Epic.      NST Performed due to FGR.  Dr. Santiago reviewed efm tracing. See NST/BPP Doc Flowsheet tab.      Darlin Curtis RN

## 2025-01-13 ENCOUNTER — HOSPITAL ENCOUNTER (OUTPATIENT)
Dept: ULTRASOUND IMAGING | Facility: CLINIC | Age: 25
Discharge: HOME | End: 2025-01-13
Attending: OBSTETRICS & GYNECOLOGY
Payer: COMMERCIAL

## 2025-01-13 ENCOUNTER — OFFICE VISIT (OUTPATIENT)
Dept: MATERNAL FETAL MEDICINE | Facility: CLINIC | Age: 25
End: 2025-01-13
Attending: OBSTETRICS & GYNECOLOGY
Payer: COMMERCIAL

## 2025-01-13 VITALS — OXYGEN SATURATION: 98 % | HEART RATE: 82 BPM | SYSTOLIC BLOOD PRESSURE: 115 MMHG | DIASTOLIC BLOOD PRESSURE: 71 MMHG

## 2025-01-13 DIAGNOSIS — O36.5990 FETAL GROWTH RESTRICTION ANTEPARTUM: ICD-10-CM

## 2025-01-13 PROCEDURE — 59025 FETAL NON-STRESS TEST: CPT

## 2025-01-13 PROCEDURE — 76820 UMBILICAL ARTERY ECHO: CPT

## 2025-01-13 NOTE — NURSING NOTE
Patient reports fetal movement, denies pain, regular contractions, leaking of fluid, or bleeding.   Patient denies headache, visual changes, nausea/vomiting, epigastric pain related to preeclampsia. SBAR given to [facility], see their note in Epic.    NST Performed due to FGR.   reviewed efm tracing. See NST/BPP Doc Flowsheet tab.